# Patient Record
Sex: MALE | NOT HISPANIC OR LATINO | Employment: FULL TIME | URBAN - METROPOLITAN AREA
[De-identification: names, ages, dates, MRNs, and addresses within clinical notes are randomized per-mention and may not be internally consistent; named-entity substitution may affect disease eponyms.]

---
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
## 2023-03-08 ENCOUNTER — HOSPITAL ENCOUNTER (EMERGENCY)
Facility: HOSPITAL | Age: 18
Discharge: HOME | End: 2023-03-08
Attending: EMERGENCY MEDICINE | Admitting: EMERGENCY MEDICINE
Payer: COMMERCIAL

## 2023-03-08 ENCOUNTER — APPOINTMENT (OUTPATIENT)
Dept: CT IMAGING | Facility: HOSPITAL | Age: 18
End: 2023-03-08
Payer: COMMERCIAL

## 2023-03-08 VITALS
TEMPERATURE: 98.6 F | SYSTOLIC BLOOD PRESSURE: 120 MMHG | OXYGEN SATURATION: 98 % | HEART RATE: 52 BPM | BODY MASS INDEX: 28.63 KG/M2 | WEIGHT: 200 LBS | HEIGHT: 70 IN | RESPIRATION RATE: 18 BRPM | DIASTOLIC BLOOD PRESSURE: 75 MMHG

## 2023-03-08 DIAGNOSIS — K92.2 GASTROINTESTINAL HEMORRHAGE, UNSPECIFIED GASTROINTESTINAL HEMORRHAGE TYPE: Primary | ICD-10-CM

## 2023-03-08 DIAGNOSIS — R10.9 ABDOMINAL PAIN, UNSPECIFIED ABDOMINAL LOCATION: ICD-10-CM

## 2023-03-08 LAB
ALBUMIN SERPL-MCNC: 4.6 G/DL (ref 3.5–5.2)
ALBUMIN/GLOB SERPL: 1.7 G/DL
ALP SERPL-CCNC: 54 U/L (ref 39–117)
ALT SERPL W P-5'-P-CCNC: 18 U/L (ref 1–41)
ANION GAP SERPL CALCULATED.3IONS-SCNC: 8 MMOL/L (ref 5–15)
AST SERPL-CCNC: 21 U/L (ref 1–40)
BASOPHILS # BLD AUTO: 0.05 10*3/MM3 (ref 0–0.2)
BASOPHILS NFR BLD AUTO: 1 % (ref 0–1.5)
BILIRUB SERPL-MCNC: 0.4 MG/DL (ref 0–1.2)
BUN SERPL-MCNC: 14 MG/DL (ref 6–20)
BUN/CREAT SERPL: 16.7 (ref 7–25)
CALCIUM SPEC-SCNC: 9.1 MG/DL (ref 8.6–10.5)
CHLORIDE SERPL-SCNC: 104 MMOL/L (ref 98–107)
CO2 SERPL-SCNC: 29 MMOL/L (ref 22–29)
CREAT SERPL-MCNC: 0.84 MG/DL (ref 0.76–1.27)
DEPRECATED RDW RBC AUTO: 37.4 FL (ref 37–54)
EGFRCR SERPLBLD CKD-EPI 2021: 128.8 ML/MIN/1.73
EOSINOPHIL # BLD AUTO: 0.15 10*3/MM3 (ref 0–0.4)
EOSINOPHIL NFR BLD AUTO: 3 % (ref 0.3–6.2)
ERYTHROCYTE [DISTWIDTH] IN BLOOD BY AUTOMATED COUNT: 12.2 % (ref 12.3–15.4)
GLOBULIN UR ELPH-MCNC: 2.7 GM/DL
GLUCOSE SERPL-MCNC: 120 MG/DL (ref 65–99)
HCT VFR BLD AUTO: 47.6 % (ref 37.5–51)
HGB BLD-MCNC: 15.9 G/DL (ref 13–17.7)
IMM GRANULOCYTES # BLD AUTO: 0.02 10*3/MM3 (ref 0–0.05)
IMM GRANULOCYTES NFR BLD AUTO: 0.4 % (ref 0–0.5)
LYMPHOCYTES # BLD AUTO: 1.96 10*3/MM3 (ref 0.7–3.1)
LYMPHOCYTES NFR BLD AUTO: 39.7 % (ref 19.6–45.3)
MCH RBC QN AUTO: 28.4 PG (ref 26.6–33)
MCHC RBC AUTO-ENTMCNC: 33.4 G/DL (ref 31.5–35.7)
MCV RBC AUTO: 85.2 FL (ref 79–97)
MONOCYTES # BLD AUTO: 0.51 10*3/MM3 (ref 0.1–0.9)
MONOCYTES NFR BLD AUTO: 10.3 % (ref 5–12)
NEUTROPHILS NFR BLD AUTO: 2.25 10*3/MM3 (ref 1.7–7)
NEUTROPHILS NFR BLD AUTO: 45.6 % (ref 42.7–76)
NRBC BLD AUTO-RTO: 0 /100 WBC (ref 0–0.2)
PLATELET # BLD AUTO: 282 10*3/MM3 (ref 140–450)
PMV BLD AUTO: 10.2 FL (ref 6–12)
POTASSIUM SERPL-SCNC: 4.1 MMOL/L (ref 3.5–5.2)
PROT SERPL-MCNC: 7.3 G/DL (ref 6–8.5)
RBC # BLD AUTO: 5.59 10*6/MM3 (ref 4.14–5.8)
SODIUM SERPL-SCNC: 141 MMOL/L (ref 136–145)
WBC NRBC COR # BLD: 4.94 10*3/MM3 (ref 3.4–10.8)

## 2023-03-08 PROCEDURE — 36415 COLL VENOUS BLD VENIPUNCTURE: CPT

## 2023-03-08 PROCEDURE — 93005 ELECTROCARDIOGRAM TRACING: CPT | Performed by: EMERGENCY MEDICINE

## 2023-03-08 PROCEDURE — 99283 EMERGENCY DEPT VISIT LOW MDM: CPT

## 2023-03-08 PROCEDURE — 74176 CT ABD & PELVIS W/O CONTRAST: CPT

## 2023-03-08 PROCEDURE — 80053 COMPREHEN METABOLIC PANEL: CPT | Performed by: EMERGENCY MEDICINE

## 2023-03-08 PROCEDURE — 85025 COMPLETE CBC W/AUTO DIFF WBC: CPT | Performed by: EMERGENCY MEDICINE

## 2023-03-08 NOTE — DISCHARGE INSTRUCTIONS
Follow-up with GI for further outpatient evaluation.    Return to the ER with any further concern.

## 2023-03-08 NOTE — ED PROVIDER NOTES
Subjective   History of Present Illness  17-year-old male who presents for evaluation of rectal bleeding.  The patient reports that for 3 years he has intermittently been passing bright red blood per rectum.  He reports that it has been more constant for the last year.  He recently has been with almost every single bowel movement that he passes blood.  He reports that at times it is bright red and at times are small clots.  He does report some pain in the rectal region with bowel movements.  He denies any abdominal pain.  No previous surgeries to the abdomen.  He does not take any anticoagulation or antiplatelet medication.  He denies chest pain, cough, shortness of breath.  No reported fever, bodies, or chills.  No previous evaluation for the this problem the past.  No other acute complaints.        Review of Systems   Constitutional: Negative for chills, fatigue and fever.   HENT: Negative for congestion, ear pain, postnasal drip, sinus pressure and sore throat.    Eyes: Negative for pain, redness and visual disturbance.   Respiratory: Negative for cough, chest tightness and shortness of breath.    Cardiovascular: Negative for chest pain, palpitations and leg swelling.   Gastrointestinal: Positive for blood in stool and rectal pain. Negative for abdominal pain, anal bleeding, diarrhea, nausea and vomiting.   Endocrine: Negative for polydipsia and polyuria.   Genitourinary: Negative for difficulty urinating, dysuria, frequency and urgency.   Musculoskeletal: Negative for arthralgias, back pain and neck pain.   Skin: Negative for pallor and rash.   Allergic/Immunologic: Negative for environmental allergies and immunocompromised state.   Neurological: Negative for dizziness, weakness and headaches.   Hematological: Negative for adenopathy.   Psychiatric/Behavioral: Negative for confusion, self-injury and suicidal ideas. The patient is not nervous/anxious.    All other systems reviewed and are negative.      No past  medical history on file.    Allergies   Allergen Reactions   • Penicillins Rash       No past surgical history on file.    No family history on file.    Social History     Socioeconomic History   • Marital status: Single   Tobacco Use   • Smoking status: Every Day     Types: Cigarettes   • Smokeless tobacco: Current           Objective   Physical Exam  Vitals and nursing note reviewed.   Constitutional:       General: He is not in acute distress.     Appearance: Normal appearance. He is well-developed. He is not toxic-appearing or diaphoretic.   HENT:      Head: Normocephalic and atraumatic.      Right Ear: External ear normal.      Left Ear: External ear normal.      Nose: Nose normal.   Eyes:      General: Lids are normal.      Pupils: Pupils are equal, round, and reactive to light.   Neck:      Trachea: No tracheal deviation.   Cardiovascular:      Rate and Rhythm: Normal rate and regular rhythm.      Pulses: No decreased pulses.      Heart sounds: Normal heart sounds. No murmur heard.    No friction rub. No gallop.   Pulmonary:      Effort: Pulmonary effort is normal. No respiratory distress.      Breath sounds: Normal breath sounds. No decreased breath sounds, wheezing, rhonchi or rales.   Abdominal:      General: Bowel sounds are normal.      Palpations: Abdomen is soft.      Tenderness: There is no abdominal tenderness. There is no guarding or rebound.   Musculoskeletal:         General: No deformity. Normal range of motion.      Cervical back: Normal range of motion and neck supple.   Lymphadenopathy:      Cervical: No cervical adenopathy.   Skin:     General: Skin is warm and dry.      Findings: No rash.   Neurological:      Mental Status: He is alert and oriented to person, place, and time.      Cranial Nerves: No cranial nerve deficit.      Sensory: No sensory deficit.   Psychiatric:         Speech: Speech normal.         Behavior: Behavior normal.         Thought Content: Thought content normal.          Judgment: Judgment normal.         Procedures           ED Course                                           Medical Decision Making  Differential diagnosis includes anal fissure, hemorrhoid, diverticulitis, diverticulosis, AV malformation, other unspecified GI bleed.    There was extremely mild blood identified on digital rectal exam.  On rectal exam I did not see an anal fissure or hemorrhoid.    Abdomen is soft and nontender.  Labs are normal.  Vital signs are normal and reassuring.    CT scan of the abdomen pelvis shows no acute abnormalities.    The patient will be discharged with referral to GI for outpatient evaluation.    Abdominal pain, unspecified abdominal location: acute illness or injury  Gastrointestinal hemorrhage, unspecified gastrointestinal hemorrhage type: acute illness or injury  Amount and/or Complexity of Data Reviewed  Independent Historian: friend  External Data Reviewed: labs.  Labs: ordered.  Radiology: ordered.  ECG/medicine tests: ordered.          Final diagnoses:   Gastrointestinal hemorrhage, unspecified gastrointestinal hemorrhage type   Abdominal pain, unspecified abdominal location       ED Disposition  ED Disposition     ED Disposition   Discharge    Condition   Stable    Comment   --             Raymundo Caba MD  [address] [place] [address]  [phone]    Schedule an appointment as soon as possible for a visit            Medication List      No changes were made to your prescriptions during this visit.          Jong Pena MD  03/08/23 2841

## 2023-03-12 LAB
QT INTERVAL: 410 MS
QTC INTERVAL: 395 MS

## 2023-04-20 ENCOUNTER — TELEPHONE (OUTPATIENT)
Dept: GASTROENTEROLOGY | Facility: CLINIC | Age: 18
End: 2023-04-20
Payer: COMMERCIAL

## 2024-06-17 ENCOUNTER — HOSPITAL ENCOUNTER (EMERGENCY)
Facility: HOSPITAL | Age: 19
Discharge: HOME | End: 2024-06-17
Attending: EMERGENCY MEDICINE | Admitting: EMERGENCY MEDICINE
Payer: COMMERCIAL

## 2024-06-17 VITALS
DIASTOLIC BLOOD PRESSURE: 76 MMHG | SYSTOLIC BLOOD PRESSURE: 146 MMHG | BODY MASS INDEX: 33.34 KG/M2 | OXYGEN SATURATION: 99 % | HEIGHT: 68 IN | WEIGHT: 220 LBS | HEART RATE: 104 BPM | RESPIRATION RATE: 16 BRPM | TEMPERATURE: 98.2 F

## 2024-06-17 DIAGNOSIS — K62.89 RECTAL PAIN: Primary | ICD-10-CM

## 2024-06-17 DIAGNOSIS — K64.4 EXTERNAL HEMORRHOIDS: ICD-10-CM

## 2024-06-17 DIAGNOSIS — K62.5 ANAL BLEEDING: ICD-10-CM

## 2024-06-17 DIAGNOSIS — Z87.19 HISTORY OF CONSTIPATION: ICD-10-CM

## 2024-06-17 PROCEDURE — 99283 EMERGENCY DEPT VISIT LOW MDM: CPT

## 2024-06-17 RX ORDER — HYDROCORTISONE ACETATE 25 MG/1
25 SUPPOSITORY RECTAL 2 TIMES DAILY
Status: DISCONTINUED | OUTPATIENT
Start: 2024-06-17 | End: 2024-06-17 | Stop reason: HOSPADM

## 2024-06-17 RX ORDER — LIDOCAINE HYDROCHLORIDE 20 MG/ML
10 SOLUTION OROPHARYNGEAL ONCE
Status: DISCONTINUED | OUTPATIENT
Start: 2024-06-17 | End: 2024-06-17

## 2024-06-17 RX ORDER — DOCUSATE SODIUM 100 MG/1
100 CAPSULE, LIQUID FILLED ORAL 2 TIMES DAILY
Qty: 60 CAPSULE | Refills: 0 | Status: SHIPPED | OUTPATIENT
Start: 2024-06-17

## 2024-06-17 RX ORDER — HYDROCORTISONE ACETATE 25 MG/1
25 SUPPOSITORY RECTAL 2 TIMES DAILY
Qty: 14 SUPPOSITORY | Refills: 0 | Status: SHIPPED | OUTPATIENT
Start: 2024-06-17 | End: 2024-06-24

## 2024-06-17 RX ORDER — POLYETHYLENE GLYCOL 3350 17 G/17G
17 POWDER, FOR SOLUTION ORAL 2 TIMES DAILY PRN
Qty: 72 PACKET | Refills: 0 | Status: SHIPPED | OUTPATIENT
Start: 2024-06-17

## 2024-06-17 RX ORDER — LIDOCAINE HYDROCHLORIDE 20 MG/ML
10 SOLUTION OROPHARYNGEAL ONCE
Status: COMPLETED | OUTPATIENT
Start: 2024-06-17 | End: 2024-06-17

## 2024-06-17 RX ADMIN — HYDROCORTISONE ACETATE 25 MG: 25 SUPPOSITORY RECTAL at 21:07

## 2024-06-17 RX ADMIN — LIDOCAINE HYDROCHLORIDE 10 ML: 20 SOLUTION ORAL at 21:07

## 2024-06-17 NOTE — Clinical Note
[facility] EMERGENCY DEPARTMENT  [address]  [facility] [address]  Phone: [phone]    Fede Bird was seen and treated in our emergency department on 6/17/2024.  He may return to work on 06/20/2024.         Thank you for choosing [facility].    Debora Nunez, PACHECO

## 2024-06-18 NOTE — DISCHARGE INSTRUCTIONS
ER evaluation reveals 2, nonthrombosed external hemorrhoids.  Recommend increase water intake and high-fiber diet.  Rx for MiraLAX 17 g by mouth 1-2 times daily until stools the consistency of soft serve ice cream.  We also will prescribe Colace 100 mg by mouth twice daily to help soften the stools.  Rx for viscous lidocaine to use 2-3 times daily for rectal discomfort and also we will prescribe Anusol HC suppositories twice daily for painful external hemorrhoids.  Recommend first available recheck with Dr. France, colorectal specialist.  Recommend colonoscopy in the near future.  Return to the ER if any worsening symptoms.

## 2024-06-18 NOTE — ED PROVIDER NOTES
Subjective   History of Present Illness  This is a 18-year-old male that presents the ER with painful external hemorrhoids.  Patient says this has been an ongoing issue for the last 2 years that he has been seen in the ER before.  He reports constipation and increased straining with bowel movements.  He passed some small hard diann earlier today and reported significant pain.  He also reports hematochezia with wiping.  He believes he may have some internal hemorrhoids, as well.  After last ER evaluation in March, 2023, patient was having similar issues of rectal bleeding and CT of the abdomen/pelvis was normal and patient had no external hemorrhoids.  Patient was referred to colorectal and recommended colonoscopy.  Patient says that he is a  and stands on his feet all day.  He currently does not have insurance with his job and he will not get insurance until October, 2024.  He has been trying over-the-counter Preparation H cream and spray.  Patient reports that he usually just eats what he cooks and he does not eat a high-fiber diet or drink lots of water.  Patient denies any abdominal pain or cramping or nausea/vomiting.  Patient denies any aspirin use, frequent NSAID use, or chronic anticoagulation.  Patient denies any significant past medical history.  No other concerns at this time.    History provided by:  Patient  Hemorrhoids  Location:  External and Internal Hemorrhoids, rectal pain, blood with wiping, hard stools with straining.  Duration: 2 years.  Timing:  Intermittent  Progression:  Waxing and waning  Context:  History of longstanding constipation and straining with bowel movements, intermittent blood with wiping x 2 years.  Increased rectal pain and patient feels some external hemorrhoids for the last several days.  Relieved by:  Nothing  Worsened by:  Defecation  Ineffective treatments:  OTC Preparation H cream and suppositories.  Associated symptoms: no abdominal pain, no diarrhea, no fatigue, no  fever, no nausea and no vomiting        Review of Systems   Constitutional: Negative.  Negative for activity change, appetite change, chills, fatigue and fever.   Respiratory: Negative.     Cardiovascular: Negative.    Gastrointestinal:  Positive for anal bleeding (Blood with wiping, intermittent x 2 years), constipation, hemorrhoids and rectal pain. Negative for abdominal distention, abdominal pain, diarrhea, nausea and vomiting.   Genitourinary: Negative.  Negative for decreased urine volume.   Musculoskeletal: Negative.  Negative for back pain.   Skin: Negative.  Negative for pallor.   Neurological: Negative.  Negative for weakness.   All other systems reviewed and are negative.      No past medical history on file.    Allergies   Allergen Reactions    Penicillins Rash       No past surgical history on file.    No family history on file.    Social History     Socioeconomic History    Marital status: Single   Tobacco Use    Smoking status: Every Day     Types: Cigarettes    Smokeless tobacco: Current           Objective   Physical Exam  Vitals and nursing note reviewed.   Constitutional:       General: He is not in acute distress.     Appearance: Normal appearance. He is not ill-appearing, toxic-appearing or diaphoretic.      Comments: No acute sign of pain or distress.  Nontoxic   HENT:      Head: Normocephalic and atraumatic.      Nose: Nose normal.      Mouth/Throat:      Mouth: Mucous membranes are moist.      Comments: Oral mucous membranes are moist  Eyes:      Extraocular Movements: Extraocular movements intact.      Conjunctiva/sclera: Conjunctivae normal.      Pupils: Pupils are equal, round, and reactive to light.   Cardiovascular:      Rate and Rhythm: Regular rhythm. Tachycardia present. No extrasystoles are present.     Pulses: Normal pulses.      Heart sounds: Normal heart sounds.      Comments: Mild tachycardia without ectopy  Pulmonary:      Effort: Pulmonary effort is normal.      Breath sounds:  Normal breath sounds.      Comments: Lungs are clear to auscultation bilaterally  Abdominal:      General: Bowel sounds are normal. There is no distension.      Palpations: Abdomen is soft.      Tenderness: There is no abdominal tenderness. There is no right CVA tenderness, left CVA tenderness, guarding or rebound.      Comments: Abdomen soft and nontender.  No flank or CVA tenderness   Genitourinary:     Rectum: Tenderness and external hemorrhoid present. No internal hemorrhoid.      Comments: Patient has 2, large external hemorrhoids.  No thrombosis.  No internal hemorrhoids appreciated.  Tenderness with digital rectal exam.  Musculoskeletal:         General: Normal range of motion.      Cervical back: Normal range of motion and neck supple.   Skin:     General: Skin is warm and dry.      Coloration: Skin is not pale.   Neurological:      General: No focal deficit present.      Mental Status: He is alert.         Procedures           ED Course  ED Course as of 06/17/24 2042 Mon Jun 17, 2024 2037 ER evaluation reveals to, nonthrombosed external hemorrhoids on digital rectal exam.  Strongly recommend patient increase water intake and adhere to a high-fiber diet.  Recommend MiraLAX 17 g by mouth 1-2 times daily until stools the consistency of soft serve ice cream as well as Colace 100 mg by mouth twice daily.  We will prescribe Anusol HC suppositories twice daily as needed for rectal discomfort and viscous lidocaine topically as needed for discomfort.  Strongly recommend colorectal follow-up with Dr. Sae France for colonoscopy in the near future.  Patient verbalized understanding. [FC]      ED Course User Index  [FC] Debora Nunez, PACHECO                                   No results found for this or any previous visit (from the past 24 hour(s)).  Note: In addition to lab results from this visit, the labs listed above may include labs taken at another facility or during a different encounter within the last 24  "hours. Please correlate lab times with ED admission and discharge times for further clarification of the services performed during this visit.    No orders to display     Vitals:    06/17/24 1904   BP: 146/76   BP Location: Left arm   Patient Position: Sitting   Pulse: 104   Resp: 16   Temp: 98.2 °F (36.8 °C)   TempSrc: Oral   SpO2: 99%   Weight: 99.8 kg (220 lb)   Height: 172.7 cm (68\")     Medications   hydrocortisone (ANUSOL-HC) suppository 25 mg (has no administration in time range)   Lidocaine Viscous HCl (XYLOCAINE) 2 % solution 10 mL (has no administration in time range)     ECG/EMG Results (last 24 hours)       ** No results found for the last 24 hours. **          No orders to display                 Medical Decision Making  Risk  Prescription drug management.        Final diagnoses:   Rectal pain   External hemorrhoids   History of constipation   Anal bleeding       ED Disposition  ED Disposition       ED Disposition   Discharge    Condition   Stable    Comment   --               Sae France MD  [address]  [facility] [address]  [phone]    Call in 1 day  Call tomorrow for first available recheck for management of ongoing external hemorrhoids    [facility] EMERGENCY DEPARTMENT  [address] [facility] [id number] [phone]    If symptoms worsen         Medication List        New Prescriptions      docusate sodium 100 MG capsule  Commonly known as: COLACE  Take 1 capsule by mouth 2 (Two) Times a Day.     hydrocortisone 25 MG suppository  Commonly known as: ANUSOL-HC  Insert 1 suppository into the rectum 2 (Two) Times a Day for 7 days.     lidocaine 2 % jelly  Commonly known as: XYLOCAINE  Apply  topically to the appropriate area as directed 3 (Three) Times a Day.     polyethylene glycol 17 g packet  Commonly known as: MIRALAX  Take 17 g by mouth 2 (Two) Times a Day As Needed (constipation).            Stop      methylPREDNISolone 4 MG dose pack  Commonly known " as: MEDROL               Where to Get Your Medications        These medications were sent to [facility] [id number] - [place], [place] - [address] AT [facility] [address] - [phone]  - [phone]   [address] [facility] [address]      Phone: [phone]   docusate sodium 100 MG capsule  hydrocortisone 25 MG suppository  lidocaine 2 % jelly  polyethylene glycol 17 g packet            Debora Nunez PASpikeC  06/17/24 2042